# Patient Record
Sex: FEMALE | Race: WHITE | NOT HISPANIC OR LATINO | Employment: FULL TIME | URBAN - METROPOLITAN AREA
[De-identification: names, ages, dates, MRNs, and addresses within clinical notes are randomized per-mention and may not be internally consistent; named-entity substitution may affect disease eponyms.]

---

## 2017-03-30 ENCOUNTER — TRANSCRIBE ORDERS (OUTPATIENT)
Dept: PHYSICAL THERAPY | Facility: CLINIC | Age: 57
End: 2017-03-30

## 2017-03-30 ENCOUNTER — TREATMENT (OUTPATIENT)
Dept: PHYSICAL THERAPY | Facility: CLINIC | Age: 57
End: 2017-03-30

## 2017-03-30 DIAGNOSIS — M25.552 PAIN OF LEFT HIP JOINT: Primary | ICD-10-CM

## 2017-03-30 DIAGNOSIS — S76.012A HIP STRAIN, LEFT, INITIAL ENCOUNTER: Primary | ICD-10-CM

## 2017-03-30 PROCEDURE — 97110 THERAPEUTIC EXERCISES: CPT | Performed by: PHYSICAL THERAPIST

## 2017-03-30 PROCEDURE — 97001 PR PHYS THERAPY EVALUATION: CPT | Performed by: PHYSICAL THERAPIST

## 2017-03-30 PROCEDURE — 97014 ELECTRIC STIMULATION THERAPY: CPT | Performed by: PHYSICAL THERAPIST

## 2017-03-31 NOTE — PROGRESS NOTES
"Physical Therapy Initial Evaluation and Plan of Care    Total time: 45 minutes    Subjective Evaluation    History of Present Illness  Onset date: one week.  Mechanism of injury: , works long days, on feet a lot, turning, changing directions etc; started to get spasms in post thigh and left piriformis area      PMH: history of L2 \"disk replacement surgery\" 15 years ago that was very successful- has not had back pain since then    Feels like hip pain is getting a little worse    Pain primarily now with sitting on jg tub, prolonged standing, lying in bed    Quality of life: fair    Pain  Current pain ratin  At best pain ratin  At worst pain ratin  Quality: dull ache, cramping and discomfort  Relieving factors: change in position and medications  Aggravating factors: movement  Progression: worsening    Patient Goals  Patient goals for therapy: decreased pain, increased motion, increased strength, return to sport/leisure activities and return to work             Objective     Postural Observations  Seated posture: poor  Standing posture: fair    Additional Postural Observation Details  Leans in chair to avoid pressure to isc tub, pain with transferring between positions; difficulty with supine to stand     Palpation     Additional Palpation Details  Piriformis, SI joint left sided, glute medius    Neurological Testing     Reflexes   Left   Patellar (L4): normal (2+)  Achilles (S1): normal (2+)    Right   Patellar (L4): normal (2+)  Achilles (S1): normal (2+)    Active Range of Motion     Lumbar   Flexion: 60 degrees with pain  Extension: 10 degrees with pain  Left lateral flexion: WFL  Right lateral flexion: WFL  Left rotation: WFL  Right rotation: WFL    Strength/Myotome Testing     Lumbar     Right   Normal strength    Left Hip   Planes of Motion   Flexion: 4    Left Knee   Flexion: 4  Extension: 4+    Left Ankle/Foot   Dorsiflexion: 5  Plantar flexion: 5  Great toe extension: 5    Tests "     Lumbar     Left   Positive passive SLR.   Negative crossed SLR.     Left Pelvic Girdle/Sacrum   Negative: sacrum compression and gapping.          Assessment & Plan     Assessment  Impairments: abnormal or restricted ROM, activity intolerance, impaired physical strength, lacks appropriate home exercise program and pain with function  Assessment details: Patient seen as a walk in today for left hip/ SI pain; has been worsening over the last week- describes pain as spasms; difficulty with transfers and constantly needs to change positions; pain to sit on isc tub; should respond well to PT to  pain and improve functional ROM and strength/enurance    Prognosis: fair  Prognosis details:   GOALS:   STG 2 WEEKS  1. FULL PAIN FREE ROM HIP AND LUMBAR SPINE  2. PAIN < 4/10 AT WORST    LTG 4 WEEKS:  1. LE STRENGTH 5/5  2. PATIENT TO DISPLAY NEGATIVE SLR TEST AND - 90/90 TEST LEFT LEG  3. PATIENT ABLE TO RTW ON FULL DUTY WITHOUT PAIN OR DYSFUNCTION      Plan  Therapy options: will be seen for skilled physical therapy services  Planned modality interventions: cryotherapy, electrical stimulation/Russian stimulation, high voltage pulsed current (pain management), iontophoresis, ultrasound, traction, thermotherapy (hydrocollator packs) and TENS  Planned therapy interventions: functional ROM exercises, home exercise program, joint mobilization, therapeutic activities, stretching, strengthening, spinal/joint mobilization, soft tissue mobilization, neuromuscular re-education and manual therapy  Frequency: 2x week  Duration in visits: 8  Treatment plan discussed with: patient        Manual Therapy:         mins  01166;  Therapeutic Exercise:    15     mins  34855;     Neuromuscular Dulce Maria:        mins  46136;    Therapeutic Activity:          mins  77471;     Gait Training:           mins  76205;     Ultrasound:          mins  75410;    Electrical Stimulation:    15     mins  25954 ( );  Dry Needling          mins  self-pay    Timed Treatment:   15   mins   Total Treatment:     45   mins    PT SIGNATURE: Brian Lexx, PT   DATE TREATMENT INITIATED: 3/31/2017    Initial Certification  Certification Period: 6/29/2017  I certify that the therapy services are furnished while this patient is under my care.  The services outlined above are required by this patient, and will be reviewed every 90 days.     PHYSICIAN:       DATE:     Please sign and return via fax to  .. Thank you, [facility].

## 2017-04-03 ENCOUNTER — TREATMENT (OUTPATIENT)
Dept: PHYSICAL THERAPY | Facility: CLINIC | Age: 57
End: 2017-04-03

## 2017-04-03 DIAGNOSIS — M25.552 PAIN OF LEFT HIP JOINT: Primary | ICD-10-CM

## 2017-04-03 PROCEDURE — 97110 THERAPEUTIC EXERCISES: CPT | Performed by: PHYSICAL THERAPIST

## 2017-04-03 PROCEDURE — 97140 MANUAL THERAPY 1/> REGIONS: CPT | Performed by: PHYSICAL THERAPIST

## 2017-04-03 NOTE — PROGRESS NOTES
Physical Therapy Daily Progress Note    Subjective   Nichole Gtz reports that she was sore after her first visit, she did more lifting at work today which has caused her pain to increase. She reports having pain when sitting on the edge of a bed or edge of a chair     Objective   See Exercise, Manual, and Modality Logs for complete treatment.     Initiated extension biased exercise, stretching, and core strengthening activities today     Assessment/Plan     Pt responded well to prone positioning and extension biased exercises. She reported having a decrease in the symptoms in her left LE.     Progress per Plan of Care           Manual Therapy:    12     mins  09225;  Therapeutic Exercise:    35     mins  47087;         Timed Treatment:   47   mins   Total Treatment:     50   mins    Michael Lira, PT  Physical Therapist

## 2017-04-05 ENCOUNTER — TREATMENT (OUTPATIENT)
Dept: PHYSICAL THERAPY | Facility: CLINIC | Age: 57
End: 2017-04-05

## 2017-04-05 DIAGNOSIS — M25.552 PAIN OF LEFT HIP JOINT: Primary | ICD-10-CM

## 2017-04-05 PROCEDURE — 97110 THERAPEUTIC EXERCISES: CPT | Performed by: PHYSICAL THERAPIST

## 2017-04-05 NOTE — PROGRESS NOTES
Physical Therapy Daily Progress Note    Subjective   Nichole Gtz reports that she is feeling about the same since her last visit. She reports doing the prone activities at night before going to bed     Objective   See Exercise, Manual, and Modality Logs for complete treatment.     Continued with extension biased exercises in the clinic today     Assessment/Plan     Attempted bridging in the clinic today but the patient had an increase in her pain, she continues to respond well to prone and extension biased activities.     Progress per Plan of Care           Manual Therapy:         mins  78932;  Therapeutic Exercise:    58     mins  44641;         Timed Treatment:   58   mins   Total Treatment:     60   mins    Michael Lira, PT  Physical Therapist

## 2017-04-12 ENCOUNTER — TREATMENT (OUTPATIENT)
Dept: PHYSICAL THERAPY | Facility: CLINIC | Age: 57
End: 2017-04-12

## 2017-04-12 DIAGNOSIS — M25.552 PAIN OF LEFT HIP JOINT: Primary | ICD-10-CM

## 2017-04-12 PROCEDURE — 97110 THERAPEUTIC EXERCISES: CPT | Performed by: PHYSICAL THERAPIST

## 2017-04-12 NOTE — PROGRESS NOTES
Physical Therapy Daily Progress Note    Total time: 75 minutes    Nichole Gtz reports: feels better overall, but still hurts badly at times    Subjective     Objective   See Exercise, Manual, and Modality Logs for complete treatment.       Assessment/Plan  Able to perform bridges (2x10) today without pain, making good progress, continue with PT to improve function and decrease pain    Progress per Plan of Care           Manual Therapy:         mins  42172;  Therapeutic Exercise:    75     mins  31095;     Neuromuscular Dulce Maria:        mins  31695;    Therapeutic Activity:          mins  77535;     Gait Training:           mins  80229;     Ultrasound:          mins  85911;    Electrical Stimulation:         mins  76627 ( );  Dry Needling          mins self-pay    Timed Treatment:   75   mins   Total Treatment:     75   mins    Brian Hallman, PT  Physical Therapist

## 2017-04-25 ENCOUNTER — TREATMENT (OUTPATIENT)
Dept: PHYSICAL THERAPY | Facility: CLINIC | Age: 57
End: 2017-04-25

## 2017-04-25 DIAGNOSIS — M25.552 PAIN OF LEFT HIP JOINT: Primary | ICD-10-CM

## 2017-04-25 PROCEDURE — 97110 THERAPEUTIC EXERCISES: CPT | Performed by: PHYSICAL THERAPIST

## 2017-04-25 NOTE — PROGRESS NOTES
Physical Therapy Daily Progress Note    Total time: 60 minutes    Nichole Gtz reports: pain about 1-2 right now; hurts a lot first thing in the morning; f/u with the MD tomorrow    Subjective     Objective   See Exercise, Manual, and Modality Logs for complete treatment.   ROM of lumbar spine WFL's and without pain at this time  Moist heat 10 min to left hip and posterior thigh    Assessment/Plan  Pt has made good progress to date; continued pain especially first thing in the am; performed new exercises well today; may benefit from continued PT to improve function and to decrease pain    Progress per Plan of Care           Manual Therapy:         mins  70127;  Therapeutic Exercise:    50     mins  36366;     Neuromuscular Dulce Maria:        mins  10463;    Therapeutic Activity:          mins  27342;     Gait Training:           mins  38097;     Ultrasound:          mins  30402;    Electrical Stimulation:         mins  65467 ( );  Dry Needling          mins self-pay    Timed Treatment:   50   mins   Total Treatment:     60   mins    Brian Hallman, PT  Physical Therapist